# Patient Record
Sex: FEMALE | Race: WHITE | NOT HISPANIC OR LATINO | Employment: FULL TIME | URBAN - NONMETROPOLITAN AREA
[De-identification: names, ages, dates, MRNs, and addresses within clinical notes are randomized per-mention and may not be internally consistent; named-entity substitution may affect disease eponyms.]

---

## 2023-07-11 ENCOUNTER — TELEPHONE (OUTPATIENT)
Dept: PRIMARY CARE | Facility: CLINIC | Age: 48
End: 2023-07-11
Payer: COMMERCIAL

## 2023-07-11 NOTE — TELEPHONE ENCOUNTER
Patient found a tick attached (location on body)    They reported the following information to me:  When (did they find it on them)- 7/11/23  Location/ Travel (where in the country) did this happen? - [facility] / [facility]   Do they know how long it was on them? ( tick engorged/full of blood)- unsure/ tick was somewhat engorged  When Tick was removed was head attached?-yes   Do you have a rash? (explain what rash look like)- no, just one red dot at attachment site   Do you have flu like symptoms?- no     I have informed the patient that in General the CDC does not recommend taking antibiotics for a tick bite and that most patients do not need prophylactic antibiotics against Lyme disease.  I explained to them that there is a very low chance of danae Lyme disease if the tick was attached less than 36 hours.  Cleaning the area with soap  & water is all that is needed in most cases but I gathered this information to be reviewed by the provider to make sure they do not meet the criteria of needing an antibiotic.   I also advised the patient if interested the CDC website also has a lot of great information on tick bites.   Please review and call patient back to provider recommendations.  Thank you
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
## 2023-07-12 RX ORDER — DOXYCYCLINE 100 MG/1
200 CAPSULE ORAL DAILY
Qty: 2 CAPSULE | Refills: 0 | Status: SHIPPED | OUTPATIENT
Start: 2023-07-12 | End: 2023-07-13

## 2023-07-12 NOTE — TELEPHONE ENCOUNTER
Call and tell her to take 1 day of doxycycline to prevent any possibility of Lyme disease.  I will send to the pharmacy

## 2023-07-13 ENCOUNTER — TELEPHONE (OUTPATIENT)
Dept: PRIMARY CARE | Facility: CLINIC | Age: 48
End: 2023-07-13
Payer: COMMERCIAL

## 2023-07-13 NOTE — TELEPHONE ENCOUNTER
Pt took her dose of doxyclycline. She ended up vomiting it up 20 mins after taking it. She took it as directed. She wants to know if she should be prescribed something else or is it ok for her to go without? Advised pt that [facility] is out and will send to on call

## 2023-07-13 NOTE — TELEPHONE ENCOUNTER
Call back regarding doxycycline (prescribed dose to prevent lyme per Dr Staton)  Called today reporting vomited 20min after took dose    No recommendation to take another dose or different antibiotic    Monitor bite area for rash and notify if occurs   Initial

## 2023-07-17 ENCOUNTER — OFFICE VISIT (OUTPATIENT)
Dept: PRIMARY CARE | Facility: CLINIC | Age: 48
End: 2023-07-17
Payer: COMMERCIAL

## 2023-07-17 VITALS
OXYGEN SATURATION: 98 % | RESPIRATION RATE: 14 BRPM | WEIGHT: 204 LBS | SYSTOLIC BLOOD PRESSURE: 137 MMHG | DIASTOLIC BLOOD PRESSURE: 73 MMHG | TEMPERATURE: 98.8 F | HEART RATE: 78 BPM | BODY MASS INDEX: 35.02 KG/M2

## 2023-07-17 DIAGNOSIS — R70.0 ELEVATED SED RATE: ICD-10-CM

## 2023-07-17 DIAGNOSIS — R79.82 CRP ELEVATED: ICD-10-CM

## 2023-07-17 DIAGNOSIS — R79.82 ELEVATED C-REACTIVE PROTEIN (CRP): Primary | ICD-10-CM

## 2023-07-17 DIAGNOSIS — M16.0 BILATERAL HIP JOINT ARTHRITIS: ICD-10-CM

## 2023-07-17 DIAGNOSIS — M25.50 ARTHRALGIA OF MULTIPLE SITES: ICD-10-CM

## 2023-07-17 DIAGNOSIS — R07.9 CHEST PAIN, UNSPECIFIED TYPE: ICD-10-CM

## 2023-07-17 PROBLEM — D72.829 LEUKOCYTOSIS: Status: ACTIVE | Noted: 2023-07-17

## 2023-07-17 PROBLEM — R94.39 ABNORMAL CARDIOVASCULAR STRESS TEST: Status: ACTIVE | Noted: 2023-07-17

## 2023-07-17 PROBLEM — M54.50 LOW BACK PAIN: Status: ACTIVE | Noted: 2023-07-17

## 2023-07-17 PROBLEM — I10 BENIGN ESSENTIAL HYPERTENSION: Status: ACTIVE | Noted: 2023-07-17

## 2023-07-17 PROBLEM — E78.5 HYPERLIPIDEMIA: Status: ACTIVE | Noted: 2023-07-17

## 2023-07-17 PROBLEM — R01.1 HEART MURMUR: Status: ACTIVE | Noted: 2023-07-17

## 2023-07-17 PROBLEM — R73.09 ELEVATED HEMOGLOBIN A1C MEASUREMENT: Status: ACTIVE | Noted: 2023-07-17

## 2023-07-17 PROCEDURE — 3078F DIAST BP <80 MM HG: CPT | Performed by: FAMILY MEDICINE

## 2023-07-17 PROCEDURE — 1036F TOBACCO NON-USER: CPT | Performed by: FAMILY MEDICINE

## 2023-07-17 PROCEDURE — 99214 OFFICE O/P EST MOD 30 MIN: CPT | Performed by: FAMILY MEDICINE

## 2023-07-17 PROCEDURE — 3075F SYST BP GE 130 - 139MM HG: CPT | Performed by: FAMILY MEDICINE

## 2023-07-17 RX ORDER — DROSPIRENONE AND ETHINYL ESTRADIOL 0.02-3(28)
KIT ORAL
COMMUNITY
End: 2023-07-18 | Stop reason: WASHOUT

## 2023-07-17 RX ORDER — NORETHINDRONE 0.35 MG/1
1 TABLET ORAL DAILY
Qty: 28 TABLET | Refills: 3 | COMMUNITY
Start: 2023-07-12 | End: 2023-10-10

## 2023-07-17 RX ORDER — ATORVASTATIN CALCIUM 40 MG/1
40 TABLET, FILM COATED ORAL NIGHTLY
COMMUNITY
End: 2024-02-23

## 2023-07-17 RX ORDER — CELECOXIB 400 MG/1
CAPSULE ORAL
COMMUNITY
Start: 2023-04-27 | End: 2023-07-18 | Stop reason: WASHOUT

## 2023-07-17 RX ORDER — MULTIVIT WITH IRON,MINERALS
TABLET ORAL
COMMUNITY
End: 2024-02-05 | Stop reason: ALTCHOICE

## 2023-07-17 RX ORDER — TURMERIC 400 MG
CAPSULE ORAL
COMMUNITY

## 2023-07-17 RX ORDER — BUTYROSPERMUM PARKII(SHEA BUTTER), SIMMONDSIA CHINENSIS (JOJOBA) SEED OIL, ALOE BARBADENSIS LEAF EXTRACT .01; 1; 3.5 G/100G; G/100G; G/100G
250 LIQUID TOPICAL 2 TIMES DAILY
COMMUNITY
End: 2024-02-05 | Stop reason: ALTCHOICE

## 2023-07-17 RX ORDER — HYDROCHLOROTHIAZIDE 25 MG/1
25 TABLET ORAL DAILY
COMMUNITY
End: 2023-09-09

## 2023-07-17 ASSESSMENT — ENCOUNTER SYMPTOMS
EYES NEGATIVE: 1
ENDOCRINE NEGATIVE: 1
CONSTITUTIONAL NEGATIVE: 1
ARTHRALGIAS: 1
ALLERGIC/IMMUNOLOGIC NEGATIVE: 1
CARDIOVASCULAR NEGATIVE: 1
RESPIRATORY NEGATIVE: 1
PSYCHIATRIC NEGATIVE: 1
GASTROINTESTINAL NEGATIVE: 1
HEMATOLOGIC/LYMPHATIC NEGATIVE: 1
NEUROLOGICAL NEGATIVE: 1

## 2023-07-17 ASSESSMENT — PAIN SCALES - GENERAL: PAINLEVEL: 3

## 2023-07-17 NOTE — PATIENT INSTRUCTIONS
1.  Elevated C-reactive protein at her at elevated sed rate.    You are seen by one of the rheumatologist in March.  Unfortunately very little recommendation was given other than the fact that you have some mild arthritis in your hip joints.  In my opinion that is not enough to explain your significant elevated C-reactive protein and sed rate.  Clinically you continue to have muscle aches joint pains more than what I would consider to be normal.    You have knee pain today    You have significant crepitation with flexion and extension of your knee joints    I am recommending a chest x-ray to determine if you have signs or symptoms of sarcoidosis.  I will call you with those results if you have enlarged lymph nodes suggesting sarcoidosis we will most likely start you on prednisone and have you meet with a specialist.    If the chest x-ray is normal we should consider repeating blood work possibly doing some other blood tests possibly having you see a different rheumatologist    Please start on a new medication meloxicam which should help reduce arthritic pain it will not change the results of the chest x-ray and it will not reduce the C-reactive protein or the sed rate.    Please take the meloxicam with food if you have stomach upset on the meloxicam please call me    We will call you with results of the x-ray and then determine what to do next

## 2023-07-17 NOTE — PROGRESS NOTES
"Subjective   Patient ID: Cristela Borden is a 48 y.o. female who presents for Follow-up (Pt saw Rheumatology 03/14/2023) and Knee Pain (Intermittent x approx 2 months/No known injury/).    HPI     Patient is not currently having periods. She was told she had bilateral hip arthritis. Patient sleeps with her arms above her head, and woke up the other night because her elbow felt \".\" She has never tried Meloxicam and does not recall if she has tried Celebrex. Patient was not recommended much from a rheumatologist after finding an elevated C-reactive protein. She reports knee pain bilaterally.      Review of Systems   Constitutional: Negative.    HENT: Negative.     Eyes: Negative.    Respiratory: Negative.     Cardiovascular: Negative.    Gastrointestinal: Negative.    Endocrine: Negative.    Genitourinary: Negative.    Musculoskeletal:  Positive for arthralgias.   Skin: Negative.    Allergic/Immunologic: Negative.    Neurological: Negative.    Hematological: Negative.    Psychiatric/Behavioral: Negative.         Objective   /73 (BP Location: Right arm, Patient Position: Sitting, BP Cuff Size: Adult)   Pulse 78   Temp 37.1 °C (98.8 °F)   Resp 14   Wt 92.5 kg (204 lb)   SpO2 98%   BMI 35.02 kg/m²     Physical Exam  Constitutional:       Appearance: Normal appearance.   HENT:      Head: Normocephalic and atraumatic.      Right Ear: Tympanic membrane normal.      Left Ear: Tympanic membrane normal.      Nose: Nose normal.      Mouth/Throat:      Mouth: Mucous membranes are moist.      Pharynx: Oropharynx is clear.   Eyes:      Extraocular Movements: Extraocular movements intact.      Conjunctiva/sclera: Conjunctivae normal.      Pupils: Pupils are equal, round, and reactive to light.   Cardiovascular:      Rate and Rhythm: Normal rate and regular rhythm.      Pulses: Normal pulses.      Heart sounds: Normal heart sounds.   Pulmonary:      Effort: Pulmonary effort is normal.      Breath sounds: Normal " breath sounds.   Abdominal:      General: Bowel sounds are normal.      Palpations: Abdomen is soft.   Musculoskeletal:         General: Normal range of motion.      Cervical back: Normal range of motion and neck supple.      Comments: Tenderness with palpation over both knee joints.   Skin:     General: Skin is warm.   Neurological:      Mental Status: She is alert and oriented to person, place, and time.   Psychiatric:         Mood and Affect: Mood normal.         Behavior: Behavior normal.       Assessment/Plan   Diagnoses and all orders for this visit:  Elevated C-reactive protein (CRP)  -     XR chest 2 views; Future  Chest pain, unspecified type  Elevated sed rate  -     XR chest 2 views; Future  Arthralgia of multiple sites  CRP elevated  Bilateral hip joint arthritis      1.  Elevated C-reactive protein at her at elevated sed rate.    You are seen by one of the rheumatologist in March.  Unfortunately very little recommendation was given other than the fact that you have some mild arthritis in your hip joints.  In my opinion that is not enough to explain your significant elevated C-reactive protein and sed rate.  Clinically you continue to have muscle aches joint pains more than what I would consider to be normal.    You have knee pain today    You have significant crepitation with flexion and extension of your knee joints    I am recommending a chest x-ray to determine if you have signs or symptoms of sarcoidosis.  I will call you with those results if you have enlarged lymph nodes suggesting sarcoidosis we will most likely start you on prednisone and have you meet with a specialist.    If the chest x-ray is normal we should consider repeating blood work possibly doing some other blood tests possibly having you see a different rheumatologist    Please start on a new medication meloxicam which should help reduce arthritic pain it will not change the results of the chest x-ray and it will not reduce the  C-reactive protein or the sed rate.    Please take the meloxicam with food if you have stomach upset on the meloxicam please call me    We will call you with results of the x-ray and then determine what to do next      Scribe Attestation  By signing my name below, IMaría , Suzanne   attest that this documentation has been prepared under the direction and in the presence of Kevyn Staton MD.

## 2023-07-18 ENCOUNTER — TELEPHONE (OUTPATIENT)
Dept: PRIMARY CARE | Facility: CLINIC | Age: 48
End: 2023-07-18
Payer: COMMERCIAL

## 2023-07-18 PROBLEM — M16.0 BILATERAL HIP JOINT ARTHRITIS: Status: ACTIVE | Noted: 2023-07-18

## 2023-07-18 PROBLEM — R70.0 ELEVATED SED RATE: Status: ACTIVE | Noted: 2023-07-18

## 2023-07-18 NOTE — TELEPHONE ENCOUNTER
Call the patient, the chest xray was normal,  I want her to get some more labs and an Urine test.  I placed the orders, depending on these results we will have her see additional specialist

## 2023-09-09 DIAGNOSIS — I10 ESSENTIAL (PRIMARY) HYPERTENSION: ICD-10-CM

## 2023-09-09 RX ORDER — HYDROCHLOROTHIAZIDE 25 MG/1
25 TABLET ORAL DAILY
Qty: 90 TABLET | Refills: 3 | Status: SHIPPED | OUTPATIENT
Start: 2023-09-09

## 2023-10-12 ENCOUNTER — TELEPHONE (OUTPATIENT)
Dept: PRIMARY CARE | Facility: CLINIC | Age: 48
End: 2023-10-12
Payer: COMMERCIAL

## 2023-10-12 DIAGNOSIS — R79.82 ELEVATED C-REACTIVE PROTEIN (CRP): Primary | ICD-10-CM

## 2023-10-12 DIAGNOSIS — M25.50 ARTHRALGIA OF MULTIPLE SITES: ICD-10-CM

## 2023-10-12 NOTE — TELEPHONE ENCOUNTER
Call and tell her the labs she had done at the outside lab continue to show an elevated sed rate and c reactive protein,  I am concerned she has some inflammation in her body that is causing these abnormal labs, I am recommending a repeat evaluation with a different rheumatologist.  I will place a referral

## 2024-01-26 ENCOUNTER — APPOINTMENT (OUTPATIENT)
Dept: RHEUMATOLOGY | Facility: CLINIC | Age: 49
End: 2024-01-26
Payer: COMMERCIAL

## 2024-01-29 PROBLEM — Z30.9 CONTRACEPTION MANAGEMENT: Status: ACTIVE | Noted: 2024-01-29

## 2024-01-29 PROBLEM — Z87.891 FORMER SMOKER: Status: ACTIVE | Noted: 2024-01-29

## 2024-01-29 PROBLEM — Z80.0 FAMILY HISTORY OF PANCREATIC CANCER: Status: ACTIVE | Noted: 2024-01-29

## 2024-01-29 PROBLEM — Z80.3 FAMILY HISTORY OF BREAST CANCER: Status: ACTIVE | Noted: 2024-01-29

## 2024-01-29 RX ORDER — DROSPIRENONE AND ETHINYL ESTRADIOL 0.02-3(28)
1 KIT ORAL DAILY
COMMUNITY
End: 2024-02-05 | Stop reason: ALTCHOICE

## 2024-01-30 PROBLEM — R94.39 ABNORMAL CARDIOVASCULAR STRESS TEST: Status: RESOLVED | Noted: 2023-07-17 | Resolved: 2024-01-30

## 2024-01-30 PROBLEM — Z87.891 FORMER SMOKER: Status: RESOLVED | Noted: 2024-01-29 | Resolved: 2024-01-30

## 2024-02-05 ENCOUNTER — OFFICE VISIT (OUTPATIENT)
Dept: RHEUMATOLOGY | Facility: CLINIC | Age: 49
End: 2024-02-05
Payer: COMMERCIAL

## 2024-02-05 VITALS
SYSTOLIC BLOOD PRESSURE: 139 MMHG | BODY MASS INDEX: 36.11 KG/M2 | TEMPERATURE: 97.2 F | HEART RATE: 85 BPM | OXYGEN SATURATION: 98 % | WEIGHT: 211.5 LBS | DIASTOLIC BLOOD PRESSURE: 81 MMHG | HEIGHT: 64 IN

## 2024-02-05 DIAGNOSIS — M77.9 ENTHESITIS: Primary | ICD-10-CM

## 2024-02-05 PROCEDURE — 3075F SYST BP GE 130 - 139MM HG: CPT | Performed by: INTERNAL MEDICINE

## 2024-02-05 PROCEDURE — 99204 OFFICE O/P NEW MOD 45 MIN: CPT | Performed by: INTERNAL MEDICINE

## 2024-02-05 PROCEDURE — 3079F DIAST BP 80-89 MM HG: CPT | Performed by: INTERNAL MEDICINE

## 2024-02-05 PROCEDURE — 1036F TOBACCO NON-USER: CPT | Performed by: INTERNAL MEDICINE

## 2024-02-05 ASSESSMENT — ENCOUNTER SYMPTOMS
LIGHT-HEADEDNESS: 0
EYE ITCHING: 0
NUMBNESS: 0
CHILLS: 0
ROS SKIN COMMENTS: HAIR LOSS
WEAKNESS: 0
MYALGIAS: 1
DIZZINESS: 0
VOICE CHANGE: 0
FLANK PAIN: 0
HEADACHES: 0
PALPITATIONS: 0
NAUSEA: 0
CONFUSION: 0
EYE PAIN: 0
NERVOUS/ANXIOUS: 0
EYE DISCHARGE: 0
PHOTOPHOBIA: 0
SHORTNESS OF BREATH: 1
DIARRHEA: 0
DYSURIA: 0
ABDOMINAL PAIN: 0
DIFFICULTY URINATING: 0
HEMATURIA: 0
TROUBLE SWALLOWING: 0
FATIGUE: 0
COLOR CHANGE: 0
JOINT SWELLING: 0
BRUISES/BLEEDS EASILY: 0
UNEXPECTED WEIGHT CHANGE: 0
BACK PAIN: 0
CONSTIPATION: 0
CHEST TIGHTNESS: 0
ARTHRALGIAS: 1
AGITATION: 0
RHINORRHEA: 0
BLOOD IN STOOL: 0
COUGH: 0
POLYDIPSIA: 0
VOMITING: 0
FEVER: 0
DYSPHORIC MOOD: 0
EYE REDNESS: 0
SLEEP DISTURBANCE: 0
SORE THROAT: 0

## 2024-02-05 ASSESSMENT — PATIENT HEALTH QUESTIONNAIRE - PHQ9
2. FEELING DOWN, DEPRESSED OR HOPELESS: NOT AT ALL
1. LITTLE INTEREST OR PLEASURE IN DOING THINGS: NOT AT ALL
SUM OF ALL RESPONSES TO PHQ9 QUESTIONS 1 AND 2: 0

## 2024-02-05 NOTE — ASSESSMENT & PLAN NOTE
Pt presents with predominantly an enthesitis of large and medium sized joints but some small joint involvement is also noted.  She has FH of spondylitis and I am concerned that she may have a seronegative spondyloarthropathy--psoriatic arthritis (no rash), reactive arthritis (no h/o infection), ankylosing spondylitis (women tend to have more peripheral disease), inflammatory bowel disease related spondylitis (no GI symptoms).  Labs ordered to further evaluate.

## 2024-02-05 NOTE — LETTER
February 5, 2024     Kevyn Staton MD  [address]  [facility] [address]  [facility] [address]    Patient: Cristela Borden   YOB: 1975   Date of Visit: 2/5/2024       Dear Dr. Kevyn Staton MD:    Thank you for referring Cristela Borden to me for evaluation. Below are my notes for this consultation.  If you have questions, please do not hesitate to call me. I look forward to following your patient along with you.       Sincerely,     Tamara Vizcarra MD      CC: No Recipients  ______________________________________________________________________________________    Rheumatology Consultation Note  PCP:  Brionna    Chief Complaint   Patient presents with   • Establish Care   • Arthritis       SUBJECTIVE  Pt presents with persistently elevated inflammatory markers.   Pt reports she was doing well until several years ago when she was started on BP and cholesterol lowering medication.   About 6 months later, she developed chest pain--failed stress test but cath was ok.  Ever since she has been achy--especially in elbows, shoulders and knees.   She has had an inflamed L achilles tendon for a year--using topical agent and not helping.   Pt saw rheum --no dx given.  Pt's mother has RA and a cousin has ankylosing spondylitis  Pt was told she had an inflamed uterus by her GYN but was told she was ok.  (Was not told she had an infection)    Has AM stiffness.  Has trouble the first time she does stairs but otherwise, ok.   Has trouble holding papers in her hand      Patient Active Problem List    Diagnosis Date Noted   • Enthesitis 02/05/2024   • Contraception management 01/29/2024   • Family history of breast cancer 01/29/2024   • Family history of pancreatic cancer 01/29/2024   • Elevated sed rate 07/18/2023   • Bilateral hip joint arthritis 07/18/2023   • Arthralgia of multiple sites 07/17/2023   • Benign essential hypertension 07/17/2023   • Elevated hemoglobin A1c measurement 07/17/2023   • Heart  murmur 07/17/2023   • Hyperlipidemia 07/17/2023   • Leukocytosis 07/17/2023   • Elevated C-reactive protein (CRP) 07/17/2023   • Low back pain 07/17/2023   • BMI 35.0-35.9,adult 07/17/2023   • Varicose veins of bilateral lower extremities with other complications 08/15/2011     Past Medical History:   Diagnosis Date   • Atypical chest pain 03/05/2019   • Does not use caffeine      Current Outpatient Medications   Medication Instructions   • atorvastatin (LIPITOR) 40 mg, oral, Nightly   • hydroCHLOROthiazide (HYDRODIURIL) 25 mg, oral, Daily, as directed   • Incassia 0.35 mg tablet 1 tablet, oral, Daily   • turmeric 400 mg capsule oral     No Known Allergies  Review of Systems   Constitutional:  Negative for chills, fatigue, fever and unexpected weight change.   HENT:  Positive for hearing loss and tinnitus. Negative for congestion, mouth sores, nosebleeds, rhinorrhea, sneezing, sore throat, trouble swallowing and voice change.         No dry eye and dry mouth   Eyes:  Negative for photophobia, pain, discharge, redness, itching and visual disturbance.   Respiratory:  Positive for shortness of breath. Negative for cough and chest tightness.    Cardiovascular:  Negative for chest pain, palpitations and leg swelling.   Gastrointestinal:  Negative for abdominal pain, blood in stool, constipation, diarrhea, nausea and vomiting.   Endocrine: Negative for polydipsia and polyuria.   Genitourinary:  Positive for pelvic pain. Negative for difficulty urinating, dysuria, flank pain and hematuria.   Musculoskeletal:  Positive for arthralgias and myalgias. Negative for back pain and joint swelling.   Skin:  Negative for color change and rash.        Hair loss   Neurological:  Negative for dizziness, syncope, weakness, light-headedness, numbness and headaches.   Hematological:  Does not bruise/bleed easily.   Psychiatric/Behavioral:  Negative for agitation, confusion, dysphoric mood and sleep disturbance. The patient is not  "nervous/anxious.        PHYSICAL EXAM  /81   Pulse 85   Temp 36.2 °C (97.2 °F)   Ht 1.626 m (5' 4\")   Wt 95.9 kg (211 lb 8 oz)   SpO2 98%   BMI 36.30 kg/m²   Physical Exam  Vitals reviewed.   Constitutional:       General: She is not in acute distress.     Appearance: Normal appearance.   HENT:      Head: Normocephalic and atraumatic.   Eyes:      Extraocular Movements: Extraocular movements intact.      Conjunctiva/sclera: Conjunctivae normal.      Pupils: Pupils are equal, round, and reactive to light.   Pulmonary:      Effort: Pulmonary effort is normal. No respiratory distress.   Musculoskeletal:         General: Swelling and tenderness present. No deformity.      Cervical back: Normal range of motion. Rigidity present. No tenderness.      Right lower leg: No edema.      Left lower leg: No edema.      Comments: Mild swelling dorsum of hand R (left normal)  Elbows without synovitis and mild tenderness to palpation over lateral epicondyle.  Shoulders with mild swelling at anterior capsule and tenderness to palpation in that area  No SI joint tenderness  Knees with lax patellae but no synovitis.  L Achilles with protuberance and thickening.  Toes with fusiform swelling   Skin:     Coloration: Skin is not pale.      Findings: No erythema or rash.   Neurological:      General: No focal deficit present.      Mental Status: She is alert and oriented to person, place, and time. Mental status is at baseline.      Gait: Gait normal.   Psychiatric:         Mood and Affect: Mood normal.       CRP and ESR elevated (reviewed in chart--see scanned documents)  Assessment/plan  Problem List Items Addressed This Visit       Enthesitis - Primary    Current Assessment & Plan     Pt presents with predominantly an enthesitis of large and medium sized joints but some small joint involvement is also noted.  She has FH of spondylitis and I am concerned that she may have a seronegative spondyloarthropathy--psoriatic arthritis " (no rash), reactive arthritis (no h/o infection), ankylosing spondylitis (women tend to have more peripheral disease), inflammatory bowel disease related spondylitis (no GI symptoms).  Labs ordered to further evaluate.           Relevant Orders    LILA with Reflex to MARGARITA    CBC and Auto Differential    Citrulline Antibody, IgG    Comprehensive Metabolic Panel    C-Reactive Protein    HLAB27 Typing    Rheumatoid Factor    Sedimentation Rate   The patient's labs, radiology images and reports and other tests done prior to appointment were obtained, reviewed and summarized as applicable from the physician portal, EHR symptoms and/or outside sources.  Pertinent positive and negative findings were considered in medical decision making.  Old records were reviewed and further were requested from previous physicians  All questions were answered and patient was counseled regarding diagnosis, prognosis, risks and benefits of various treatment options and importance of compliance with therapy    Follow up: based on results

## 2024-02-05 NOTE — PROGRESS NOTES
Rheumatology Consultation Note  PCP:  Brionna    Chief Complaint   Patient presents with    Establish Care    Arthritis       SUBJECTIVE  Pt presents with persistently elevated inflammatory markers.   Pt reports she was doing well until several years ago when she was started on BP and cholesterol lowering medication.   About 6 months later, she developed chest pain--failed stress test but cath was ok.  Ever since she has been achy--especially in elbows, shoulders and knees.   She has had an inflamed L achilles tendon for a year--using topical agent and not helping.   Pt saw rheum --no dx given.  Pt's mother has RA and a cousin has ankylosing spondylitis  Pt was told she had an inflamed uterus by her GYN but was told she was ok.  (Was not told she had an infection)    Has AM stiffness.  Has trouble the first time she does stairs but otherwise, ok.   Has trouble holding papers in her hand      Patient Active Problem List    Diagnosis Date Noted    Enthesitis 02/05/2024    Contraception management 01/29/2024    Family history of breast cancer 01/29/2024    Family history of pancreatic cancer 01/29/2024    Elevated sed rate 07/18/2023    Bilateral hip joint arthritis 07/18/2023    Arthralgia of multiple sites 07/17/2023    Benign essential hypertension 07/17/2023    Elevated hemoglobin A1c measurement 07/17/2023    Heart murmur 07/17/2023    Hyperlipidemia 07/17/2023    Leukocytosis 07/17/2023    Elevated C-reactive protein (CRP) 07/17/2023    Low back pain 07/17/2023    BMI 35.0-35.9,adult 07/17/2023    Varicose veins of bilateral lower extremities with other complications 08/15/2011     Past Medical History:   Diagnosis Date    Atypical chest pain 03/05/2019    Does not use caffeine      Current Outpatient Medications   Medication Instructions    atorvastatin (LIPITOR) 40 mg, oral, Nightly    hydroCHLOROthiazide (HYDRODIURIL) 25 mg, oral, Daily, as directed    Incassia 0.35 mg tablet 1 tablet, oral, Daily    turmeric  "400 mg capsule oral     No Known Allergies  Review of Systems   Constitutional:  Negative for chills, fatigue, fever and unexpected weight change.   HENT:  Positive for hearing loss and tinnitus. Negative for congestion, mouth sores, nosebleeds, rhinorrhea, sneezing, sore throat, trouble swallowing and voice change.         No dry eye and dry mouth   Eyes:  Negative for photophobia, pain, discharge, redness, itching and visual disturbance.   Respiratory:  Positive for shortness of breath. Negative for cough and chest tightness.    Cardiovascular:  Negative for chest pain, palpitations and leg swelling.   Gastrointestinal:  Negative for abdominal pain, blood in stool, constipation, diarrhea, nausea and vomiting.   Endocrine: Negative for polydipsia and polyuria.   Genitourinary:  Positive for pelvic pain. Negative for difficulty urinating, dysuria, flank pain and hematuria.   Musculoskeletal:  Positive for arthralgias and myalgias. Negative for back pain and joint swelling.   Skin:  Negative for color change and rash.        Hair loss   Neurological:  Negative for dizziness, syncope, weakness, light-headedness, numbness and headaches.   Hematological:  Does not bruise/bleed easily.   Psychiatric/Behavioral:  Negative for agitation, confusion, dysphoric mood and sleep disturbance. The patient is not nervous/anxious.        PHYSICAL EXAM  /81   Pulse 85   Temp 36.2 °C (97.2 °F)   Ht 1.626 m (5' 4\")   Wt 95.9 kg (211 lb 8 oz)   SpO2 98%   BMI 36.30 kg/m²   Physical Exam  Vitals reviewed.   Constitutional:       General: She is not in acute distress.     Appearance: Normal appearance.   HENT:      Head: Normocephalic and atraumatic.   Eyes:      Extraocular Movements: Extraocular movements intact.      Conjunctiva/sclera: Conjunctivae normal.      Pupils: Pupils are equal, round, and reactive to light.   Pulmonary:      Effort: Pulmonary effort is normal. No respiratory distress.   Musculoskeletal:         " General: Swelling and tenderness present. No deformity.      Cervical back: Normal range of motion. Rigidity present. No tenderness.      Right lower leg: No edema.      Left lower leg: No edema.      Comments: Mild swelling dorsum of hand R (left normal)  Elbows without synovitis and mild tenderness to palpation over lateral epicondyle.  Shoulders with mild swelling at anterior capsule and tenderness to palpation in that area  No SI joint tenderness  Knees with lax patellae but no synovitis.  L Achilles with protuberance and thickening.  Toes with fusiform swelling   Skin:     Coloration: Skin is not pale.      Findings: No erythema or rash.   Neurological:      General: No focal deficit present.      Mental Status: She is alert and oriented to person, place, and time. Mental status is at baseline.      Gait: Gait normal.   Psychiatric:         Mood and Affect: Mood normal.       CRP and ESR elevated (reviewed in chart--see scanned documents)  Assessment/plan  Problem List Items Addressed This Visit       Enthesitis - Primary    Current Assessment & Plan     Pt presents with predominantly an enthesitis of large and medium sized joints but some small joint involvement is also noted.  She has FH of spondylitis and I am concerned that she may have a seronegative spondyloarthropathy--psoriatic arthritis (no rash), reactive arthritis (no h/o infection), ankylosing spondylitis (women tend to have more peripheral disease), inflammatory bowel disease related spondylitis (no GI symptoms).  Labs ordered to further evaluate.           Relevant Orders    LILA with Reflex to MARGARITA    CBC and Auto Differential    Citrulline Antibody, IgG    Comprehensive Metabolic Panel    C-Reactive Protein    HLAB27 Typing    Rheumatoid Factor    Sedimentation Rate   The patient's labs, radiology images and reports and other tests done prior to appointment were obtained, reviewed and summarized as applicable from the physician portal, EHR  symptoms and/or outside sources.  Pertinent positive and negative findings were considered in medical decision making.  Old records were reviewed and further were requested from previous physicians  All questions were answered and patient was counseled regarding diagnosis, prognosis, risks and benefits of various treatment options and importance of compliance with therapy    Follow up: based on results

## 2024-02-09 DIAGNOSIS — M47.819 SPONDYLOARTHRITIS: Primary | ICD-10-CM

## 2024-02-16 ENCOUNTER — OFFICE VISIT (OUTPATIENT)
Dept: PRIMARY CARE | Facility: CLINIC | Age: 49
End: 2024-02-16
Payer: COMMERCIAL

## 2024-02-16 VITALS
HEIGHT: 64 IN | BODY MASS INDEX: 35.27 KG/M2 | OXYGEN SATURATION: 100 % | HEART RATE: 84 BPM | WEIGHT: 206.6 LBS | SYSTOLIC BLOOD PRESSURE: 136 MMHG | TEMPERATURE: 98 F | DIASTOLIC BLOOD PRESSURE: 74 MMHG

## 2024-02-16 DIAGNOSIS — E78.2 MIXED HYPERLIPIDEMIA: ICD-10-CM

## 2024-02-16 DIAGNOSIS — Z12.31 BREAST CANCER SCREENING BY MAMMOGRAM: ICD-10-CM

## 2024-02-16 DIAGNOSIS — Z00.00 PHYSICAL EXAM, ANNUAL: Primary | ICD-10-CM

## 2024-02-16 DIAGNOSIS — E78.5 HYPERLIPIDEMIA, UNSPECIFIED HYPERLIPIDEMIA TYPE: ICD-10-CM

## 2024-02-16 DIAGNOSIS — R79.82 ELEVATED C-REACTIVE PROTEIN (CRP): ICD-10-CM

## 2024-02-16 DIAGNOSIS — Z12.11 ENCOUNTER FOR SCREENING FOR MALIGNANT NEOPLASM OF COLON: ICD-10-CM

## 2024-02-16 LAB — SEDIMENTATION RATE, ERYTHROCYTE EXTERNAL: 28 MM/H

## 2024-02-16 PROCEDURE — 3078F DIAST BP <80 MM HG: CPT | Performed by: FAMILY MEDICINE

## 2024-02-16 PROCEDURE — 3075F SYST BP GE 130 - 139MM HG: CPT | Performed by: FAMILY MEDICINE

## 2024-02-16 PROCEDURE — 99396 PREV VISIT EST AGE 40-64: CPT | Performed by: FAMILY MEDICINE

## 2024-02-16 PROCEDURE — 1036F TOBACCO NON-USER: CPT | Performed by: FAMILY MEDICINE

## 2024-02-16 RX ORDER — FERROUS SULFATE, DRIED 160(50) MG
1 TABLET, EXTENDED RELEASE ORAL DAILY
COMMUNITY

## 2024-02-16 RX ORDER — CELECOXIB 200 MG/1
200 CAPSULE ORAL DAILY
Qty: 90 CAPSULE | Refills: 3 | Status: SHIPPED | OUTPATIENT
Start: 2024-02-16 | End: 2025-02-15

## 2024-02-16 RX ORDER — VITAMIN E MIXED 400 UNIT
CAPSULE ORAL DAILY
COMMUNITY

## 2024-02-16 ASSESSMENT — ENCOUNTER SYMPTOMS
CONSTITUTIONAL NEGATIVE: 1
PSYCHIATRIC NEGATIVE: 1
NEUROLOGICAL NEGATIVE: 1
CARDIOVASCULAR NEGATIVE: 1
GASTROINTESTINAL NEGATIVE: 1
HEMATOLOGIC/LYMPHATIC NEGATIVE: 1
ALLERGIC/IMMUNOLOGIC NEGATIVE: 1
RESPIRATORY NEGATIVE: 1
ENDOCRINE NEGATIVE: 1
MUSCULOSKELETAL NEGATIVE: 1
EYES NEGATIVE: 1

## 2024-02-16 NOTE — PROGRESS NOTES
"Subjective   Patient ID: Cristela Borden is a 48 y.o. female who presents for Annual Exam (CPE).    HPI     The patient is wondering why her C-Reactive protein elevated and is concerned.     The patient is taking birth control pills. She gets pelvic pain and had an ultrasound done which should an ovarian cyst.     The patient mentions persistent sinus drainage.    The patient denies any changes in vision, hearing or dental.     The patient maintains they do not have any chest pain, chest tightness or shortness of breath.    They do not experience nausea, emesis, changes in bowel movements or dyspepsia.    The patient's colonoscopy is not up to date.    The patient's mammogram is not up to date.    The patient's vaccinations are up to date.      Review of Systems   Constitutional: Negative.    HENT: Negative.     Eyes: Negative.    Respiratory: Negative.     Cardiovascular: Negative.    Gastrointestinal: Negative.    Endocrine: Negative.    Genitourinary: Negative.    Musculoskeletal: Negative.    Skin: Negative.    Allergic/Immunologic: Negative.    Neurological: Negative.    Hematological: Negative.    Psychiatric/Behavioral: Negative.         Objective   /74 (BP Location: Right arm, Patient Position: Sitting, BP Cuff Size: Large adult)   Pulse 84   Temp 36.7 °C (98 °F) (Temporal)   Ht 1.632 m (5' 4.25\")   Wt 93.7 kg (206 lb 9.6 oz)   SpO2 100%   BMI 35.19 kg/m²     Physical Exam  Constitutional:       Appearance: Normal appearance.   HENT:      Head: Normocephalic and atraumatic.      Nose: Nose normal.   Eyes:      Extraocular Movements: Extraocular movements intact.      Conjunctiva/sclera: Conjunctivae normal.      Pupils: Pupils are equal, round, and reactive to light.   Cardiovascular:      Rate and Rhythm: Normal rate and regular rhythm.      Pulses: Normal pulses.      Heart sounds: Normal heart sounds.   Pulmonary:      Effort: Pulmonary effort is normal.      Breath sounds: Normal breath " sounds.   Abdominal:      General: Bowel sounds are normal.      Palpations: Abdomen is soft.   Genitourinary:     General: Normal vulva.      Rectum: Normal.   Musculoskeletal:         General: Normal range of motion.      Cervical back: Normal range of motion and neck supple.   Skin:     General: Skin is warm.   Neurological:      Mental Status: She is alert and oriented to person, place, and time.   Psychiatric:         Mood and Affect: Mood normal.         Behavior: Behavior normal.         Thought Content: Thought content normal.         Judgment: Judgment normal.         Assessment/Plan   Problem List Items Addressed This Visit             ICD-10-CM    Hyperlipidemia E78.5    Relevant Orders    Lipid Panel    Follow Up In [facility] - PCP - Established    Elevated C-reactive protein (CRP) R79.82    Breast cancer screening by mammogram - Primary Z12.31    Relevant Orders    BI mammo bilateral screening tomosynthesis     Other Visit Diagnoses         Codes    Encounter for screening for malignant neoplasm of colon     Z12.11    Relevant Orders    Colonoscopy Screening; Average Risk Patient               1. Physical exam, annual  Hemoglobin A1C    Tsh With Reflex To Free T4 If Abnormal    T3, free    T4, free      2. Hyperlipidemia, unspecified hyperlipidemia type  Follow Up In [facility] - PCP - Established      3. Encounter for screening for malignant neoplasm of colon  Colonoscopy Screening; Average Risk Patient      4. Breast cancer screening by mammogram  BI mammo bilateral screening tomosynthesis      5. Mixed hyperlipidemia  Lipid Panel      6. Elevated C-reactive protein (CRP)          1.  Well visit    Today in the office he had your annual wellness exam    Please have a mammogram for breast cancer screening    You are up-to-date with your tetanus shot    Please have fasting labs at your convenience we will check cholesterol and thyroid.  All other labs have recently been tested.   We will also check a hemoglobin A1c screening for diabetes.    Keep eating a heart healthy diet a good goal 5-7 servings of fresh fruit and vegetable every day along with lean protein avoid simple sugars avoid fast foods    Keep walking keep being active 30 minutes 5 days a week is ideal certainly would call if any chest pains with activity    Continue on atorvastatin 40 mg a day to help lower cholesterol to prevent heart attack and stroke    Continue on hydrochlorothiazide 25 mg a day to help lower blood pressure which also reduces risk for heart attack and stroke    Keep seeing your gynecologist on annual basis    Please follow-up with the rheumatologist in regards to your elevated C-reactive protein and your inflammation.  Due to the elevated C-reactive protein and inflammation I am recommending a colonoscopy we can place that referral    If you otherwise stay healthy I would like to see you back in 6 months to check on blood pressure and cholesterol I am happy to see you sooner if needed    Follow-up in 6 months or sooner if there are any concerns.    Scribe Attestation  By signing my name below, IBethany, Scribe   attest that this documentation has been prepared under the direction and in the presence of Kevyn Staton MD.    This note has been transcribed using a medical scribe and there is a possibility of unintentional typing misprints.

## 2024-02-16 NOTE — PATIENT INSTRUCTIONS
1.  Well visit    Today in the office he had your annual wellness exam    Please have a mammogram for breast cancer screening    You are up-to-date with your tetanus shot    Please have fasting labs at your convenience we will check cholesterol and thyroid.  All other labs have recently been tested.  We will also check a hemoglobin A1c screening for diabetes.    Keep eating a heart healthy diet a good goal 5-7 servings of fresh fruit and vegetable every day along with lean protein avoid simple sugars avoid fast foods    Keep walking keep being active 30 minutes 5 days a week is ideal certainly would call if any chest pains with activity    Continue on atorvastatin 40 mg a day to help lower cholesterol to prevent heart attack and stroke    Continue on hydrochlorothiazide 25 mg a day to help lower blood pressure which also reduces risk for heart attack and stroke    Keep seeing your gynecologist on annual basis    Please follow-up with the rheumatologist in regards to your elevated C-reactive protein and your inflammation.  Due to the elevated C-reactive protein and inflammation I am recommending a colonoscopy we can place that referral    If you otherwise stay healthy I would like to see you back in 6 months to check on blood pressure and cholesterol I am happy to see you sooner if needed

## 2024-02-23 DIAGNOSIS — E78.5 HYPERLIPIDEMIA, UNSPECIFIED: ICD-10-CM

## 2024-02-23 RX ORDER — ATORVASTATIN CALCIUM 40 MG/1
40 TABLET, FILM COATED ORAL NIGHTLY
Qty: 90 TABLET | Refills: 3 | Status: SHIPPED | OUTPATIENT
Start: 2024-02-23

## 2024-05-31 ENCOUNTER — OFFICE VISIT (OUTPATIENT)
Dept: CARDIOLOGY | Facility: CLINIC | Age: 49
End: 2024-05-31
Payer: COMMERCIAL

## 2024-05-31 VITALS
WEIGHT: 207.9 LBS | BODY MASS INDEX: 35.49 KG/M2 | HEART RATE: 84 BPM | SYSTOLIC BLOOD PRESSURE: 132 MMHG | HEIGHT: 64 IN | DIASTOLIC BLOOD PRESSURE: 74 MMHG

## 2024-05-31 DIAGNOSIS — R01.1 HEART MURMUR: ICD-10-CM

## 2024-05-31 DIAGNOSIS — E78.2 MIXED HYPERLIPIDEMIA: ICD-10-CM

## 2024-05-31 DIAGNOSIS — I10 BENIGN ESSENTIAL HYPERTENSION: ICD-10-CM

## 2024-05-31 PROCEDURE — 99213 OFFICE O/P EST LOW 20 MIN: CPT | Performed by: INTERNAL MEDICINE

## 2024-05-31 PROCEDURE — 1036F TOBACCO NON-USER: CPT | Performed by: INTERNAL MEDICINE

## 2024-05-31 PROCEDURE — 3008F BODY MASS INDEX DOCD: CPT | Performed by: INTERNAL MEDICINE

## 2024-05-31 PROCEDURE — 3078F DIAST BP <80 MM HG: CPT | Performed by: INTERNAL MEDICINE

## 2024-05-31 PROCEDURE — 3075F SYST BP GE 130 - 139MM HG: CPT | Performed by: INTERNAL MEDICINE

## 2024-05-31 ASSESSMENT — ENCOUNTER SYMPTOMS
NEUROLOGICAL NEGATIVE: 1
CONSTITUTIONAL NEGATIVE: 1
CARDIOVASCULAR NEGATIVE: 1
RESPIRATORY NEGATIVE: 1

## 2024-05-31 NOTE — PROGRESS NOTES
CARDIOLOGY OFFICE VISIT      CHIEF COMPLAINT      HISTORY OF PRESENT ILLNESS  The patient states she has been doing well.  She denies chest discomfort.  She denies dyspnea on exertion.  She denies palpitations, presyncope, and syncope.  She denies any problem with her medication.  She states that when she gets her blood pressure checked it is under control.  She has lab work done every 6 months by her family physician and she states the lab work is good.      Impression:  Hypertension  Hyperlipidemia  Overweight  Former smoker     Please excuse any errors in grammar or translation related to this dictation. Voice recognition software was utilized to prepare this document.        Past Medical History  Past Medical History:   Diagnosis Date    Atypical chest pain 03/05/2019    Does not use caffeine        Social History  Social History     Tobacco Use    Smoking status: Former     Current packs/day: 0.50     Average packs/day: 0.5 packs/day for 3.0 years (1.5 ttl pk-yrs)     Types: Cigarettes     Passive exposure: Past    Smokeless tobacco: Never   Substance Use Topics    Alcohol use: Yes     Alcohol/week: 6.0 standard drinks of alcohol     Types: 6 Standard drinks or equivalent per week     Comment: social    Drug use: Never       Family History     Family History   Problem Relation Name Age of Onset    Other (cerebrovascular accident CVA) Mother      Other (malignant neoplasm of breast) Mother      Skin cancer Mother      Rheum arthritis Mother      Other (no pertinent family history) Father      Other (transposition of nerve in arm) Son      Pancreatic cancer Maternal Grandmother      Ankylosing spondylitis Cousin      Diabetes Other multiple     Hypertension Other multiple         Allergies:  No Known Allergies     Outpatient Medications:  Current Outpatient Medications   Medication Instructions    atorvastatin (LIPITOR) 40 mg, oral, Nightly    B complex-vitamin C-folic acid (Nephro-[facility] Rx) 1- mg-mg-mcg  tablet 1 tablet, oral, Daily with breakfast    calcium carbonate-vitamin D3 500 mg-5 mcg (200 unit) tablet 1 tablet, oral, Daily    celecoxib (CELEBREX) 200 mg, oral, Daily    hydroCHLOROthiazide (HYDRODIURIL) 25 mg, oral, Daily, as directed    Incassia 0.35 mg tablet 1 tablet, oral, Daily    turmeric 400 mg capsule oral    vitamin E 450 mg (1000 unit) capsule oral, Daily          REVIEW OF SYSTEMS  Review of Systems   Constitutional: Negative.   Cardiovascular: Negative.    Respiratory: Negative.     Neurological: Negative.    All other systems reviewed and are negative.        VITALS  Vitals:    05/31/24 1027   BP: 132/74   Pulse: 84       PHYSICAL EXAM  Constitutional:       Appearance: Healthy appearance. Not in distress.   Eyes:      Conjunctiva/sclera: Conjunctivae normal.      Pupils: Pupils are equal, round, and reactive to light.   Neck:      Vascular: No JVR. JVD normal.   Pulmonary:      Effort: Pulmonary effort is normal.      Breath sounds: Normal breath sounds. No wheezing. No rhonchi. No rales.   Chest:      Chest wall: Not tender to palpatation.   Cardiovascular:      PMI at left midclavicular line. Normal rate. Regular rhythm. Normal S1. Normal S2.       Murmurs: There is no murmur.      No gallop.  No click. No rub.   Pulses:     Intact distal pulses.   Edema:     Peripheral edema absent.   Abdominal:      Tenderness: There is no abdominal tenderness.   Musculoskeletal: Normal range of motion.         General: No tenderness.      Cervical back: Normal range of motion. Skin:     General: Skin is warm and dry.   Neurological:      General: No focal deficit present.      Mental Status: Alert and oriented to person, place and time.           ASSESSMENT AND PLAN  Problem List Items Addressed This Visit       Benign essential hypertension    Heart murmur    Hyperlipidemia    BMI 35.0-35.9,adult       [unfilled]

## 2024-07-05 ENCOUNTER — TELEPHONE (OUTPATIENT)
Dept: PRIMARY CARE | Facility: CLINIC | Age: 49
End: 2024-07-05
Payer: COMMERCIAL

## 2024-07-05 DIAGNOSIS — R91.1 PULMONARY NODULE: Primary | ICD-10-CM

## 2024-07-05 NOTE — TELEPHONE ENCOUNTER
"Patient had chest xray done prior to a procedure and had an incidental finding:    \"Small nodular focus projecting over the left lung apex is nonspecific and could represent a confluence of overlapping shadows. Cannot exclude small sclerotic bone lesion or a 5 mm pulmonary nodule. Given patient's neoplastic history, consider CT for further evaluation. \"    She is asking if you can order this prior to appointment in August   "

## 2024-08-24 ENCOUNTER — APPOINTMENT (OUTPATIENT)
Dept: PRIMARY CARE | Facility: CLINIC | Age: 49
End: 2024-08-24
Payer: COMMERCIAL

## 2024-08-24 VITALS
WEIGHT: 207.8 LBS | OXYGEN SATURATION: 98 % | DIASTOLIC BLOOD PRESSURE: 76 MMHG | BODY MASS INDEX: 35.67 KG/M2 | TEMPERATURE: 97.4 F | SYSTOLIC BLOOD PRESSURE: 119 MMHG | HEART RATE: 88 BPM

## 2024-08-24 DIAGNOSIS — E55.9 VITAMIN D DEFICIENCY: ICD-10-CM

## 2024-08-24 DIAGNOSIS — E78.2 MIXED HYPERLIPIDEMIA: ICD-10-CM

## 2024-08-24 DIAGNOSIS — M77.9 ENTHESITIS: ICD-10-CM

## 2024-08-24 DIAGNOSIS — I10 ESSENTIAL (PRIMARY) HYPERTENSION: ICD-10-CM

## 2024-08-24 DIAGNOSIS — I10 BENIGN ESSENTIAL HYPERTENSION: ICD-10-CM

## 2024-08-24 DIAGNOSIS — Z00.00 PHYSICAL EXAM, ANNUAL: ICD-10-CM

## 2024-08-24 DIAGNOSIS — E78.5 HYPERLIPIDEMIA, UNSPECIFIED HYPERLIPIDEMIA TYPE: ICD-10-CM

## 2024-08-24 DIAGNOSIS — Z00.00 ENCOUNTER FOR WELLNESS EXAMINATION IN ADULT: Primary | ICD-10-CM

## 2024-08-24 DIAGNOSIS — M25.50 ARTHRALGIA OF MULTIPLE SITES: ICD-10-CM

## 2024-08-24 DIAGNOSIS — Z09 ENCOUNTER FOR FOLLOW-UP: ICD-10-CM

## 2024-08-24 PROCEDURE — 3074F SYST BP LT 130 MM HG: CPT | Performed by: FAMILY MEDICINE

## 2024-08-24 PROCEDURE — 99214 OFFICE O/P EST MOD 30 MIN: CPT | Performed by: FAMILY MEDICINE

## 2024-08-24 PROCEDURE — 3078F DIAST BP <80 MM HG: CPT | Performed by: FAMILY MEDICINE

## 2024-08-24 RX ORDER — BISMUTH SUBSALICYLATE 262 MG
1 TABLET,CHEWABLE ORAL DAILY
COMMUNITY

## 2024-08-24 RX ORDER — HYDROCHLOROTHIAZIDE 25 MG/1
25 TABLET ORAL DAILY
Qty: 90 TABLET | Refills: 3 | Status: SHIPPED | OUTPATIENT
Start: 2024-08-24

## 2024-08-24 ASSESSMENT — ENCOUNTER SYMPTOMS
GASTROINTESTINAL NEGATIVE: 1
CARDIOVASCULAR NEGATIVE: 1
VOMITING: 0
DIARRHEA: 0
CONSTITUTIONAL NEGATIVE: 1
NAUSEA: 0
CHEST TIGHTNESS: 0
NEUROLOGICAL NEGATIVE: 1
ALLERGIC/IMMUNOLOGIC NEGATIVE: 1
EYES NEGATIVE: 1
HEMATOLOGIC/LYMPHATIC NEGATIVE: 1
PSYCHIATRIC NEGATIVE: 1
MUSCULOSKELETAL NEGATIVE: 1
ENDOCRINE NEGATIVE: 1
RESPIRATORY NEGATIVE: 1
SHORTNESS OF BREATH: 0

## 2024-08-24 NOTE — PATIENT INSTRUCTIONS
1 well visit    Today in the office you had your annual wellness exam    You are up-to-date with your mammogram for breast cancer screening    You are up-to-date with your colonoscopy with a Cologuard which was negative    Please have fasting labs at your convenience we will check kidney function liver function blood sugar cholesterol thyroid vitamin D we will notify you of all those results    Your blood pressure is excellent today please stay on current doses of hydrochlorothiazide 25 mg a day by lowering your blood pressure we are reducing risk for heart attack and stroke    Please stay on current doses of atorvastatin which lowers your cholesterol which also reduces risk for heart attack and stroke.  Equally as important is eating a heart healthy diet a good goal 5-7 servings of fresh fruit and vegetable every day along with lean proteins avoiding simple sugars avoiding fast foods    Keep exercising keep being active once your foot improves 30 minutes of exercise a day is ideal certainly would call if you have chest pains with activities    You have a significant mount of wax in your left ear we will flush that before you leave the office today.  Please continue using your hearing aids as you are reducing your risk for dementia later on in life    Continue following up with your gynecologist as we discussed    If all your labs returned normal and you remain healthy I will see you back in 6 months    If you change your mind about a flu shot you could have 1 done here or at the pharmacy

## 2024-08-24 NOTE — PROGRESS NOTES
Subjective   Patient ID: Cristela Borden is a 49 y.o. female who presents for Follow-up (6 mo fuv bp, chol, no issues or complaints today).    HPI     Hyperlipidemia: The patient is presenting today for a follow up of hyperlipidemia. The patient has not been hospitalized for this in the last 6 months. The patient is compliant with medications. Patient denies any side effects to the medications.     Hypertension: The patient is here for an evaluation of elevated blood pressure. The patient is trying to follow a low-salt diet. She is adherent to a low salt diet. Blood pressure is well controlled at home, with ranges being in not doing. The patient has not been hospitalized for this in the last 6 months. The patient is compliant with medications, which are currently  HCTZ . Patient denies any side effects to the medications.     Arthritis: Cristela Borden is an 49 y.o. female who presents with arthralgias and myalgias. Pain is located in multiple joints. The patient has not been hospitalized for this in the last 6 months.  She is still experiencing pain in her elbow where her pinky finger will become numb randomly.     The patient denies any changes in vision, hearing or dental.     The patient maintains they do not have any chest pain, chest tightness or shortness of breath.    They do not experience nausea, emesis, changes in bowel movements or dyspepsia.    The patient's colonoscopy is up to date.    The patient's mammogram is not up to date.    The patient's vaccinations are up to date.      Review of Systems   Constitutional: Negative.    HENT: Negative.  Negative for dental problem and hearing loss.    Eyes: Negative.  Negative for visual disturbance.   Respiratory: Negative.  Negative for chest tightness and shortness of breath.    Cardiovascular: Negative.  Negative for chest pain.   Gastrointestinal: Negative.  Negative for diarrhea, nausea and vomiting.   Endocrine: Negative.    Genitourinary: Negative.     Musculoskeletal: Negative.    Skin: Negative.    Allergic/Immunologic: Negative.    Neurological: Negative.    Hematological: Negative.    Psychiatric/Behavioral: Negative.         Objective   /76 (BP Location: Right arm, Patient Position: Sitting, BP Cuff Size: Large adult)   Pulse 88   Temp 36.3 °C (97.4 °F) (Temporal)   Wt 94.3 kg (207 lb 12.8 oz)   LMP  (LMP Unknown)   SpO2 98%   BMI 35.67 kg/m²     Physical Exam  Constitutional:       Appearance: Normal appearance.   HENT:      Head: Normocephalic and atraumatic.      Left Ear: There is impacted cerumen.      Nose: Nose normal.   Eyes:      Extraocular Movements: Extraocular movements intact.      Conjunctiva/sclera: Conjunctivae normal.      Pupils: Pupils are equal, round, and reactive to light.   Cardiovascular:      Rate and Rhythm: Normal rate and regular rhythm.      Pulses: Normal pulses.      Heart sounds: Normal heart sounds.   Pulmonary:      Effort: Pulmonary effort is normal.      Breath sounds: Normal breath sounds.   Abdominal:      General: Bowel sounds are normal.      Palpations: Abdomen is soft.   Genitourinary:     General: Normal vulva.      Rectum: Normal.   Musculoskeletal:         General: Normal range of motion.      Cervical back: Normal range of motion and neck supple.   Skin:     General: Skin is warm.   Neurological:      Mental Status: She is alert and oriented to person, place, and time.   Psychiatric:         Mood and Affect: Mood normal.         Behavior: Behavior normal.         Thought Content: Thought content normal.         Judgment: Judgment normal.         Assessment/Plan          1. Encounter for wellness examination in adult  Tsh With Reflex To Free T4 If Abnormal    Hemoglobin A1C    Lipid Panel    Comprehensive Metabolic Panel    CBC and Auto Differential    Vitamin D 25-Hydroxy,Total (for eval of Vitamin D levels)    C-reactive protein      2. Hyperlipidemia, unspecified hyperlipidemia type  Follow Up In  [facility] - PCP - Established      3. Encounter for follow-up        4. Benign essential hypertension        5. Arthralgia of multiple sites        6. Essential (primary) hypertension  hydroCHLOROthiazide (HYDRODiuril) 25 mg tablet    Follow Up In [facility] - PCP - Established      7. Physical exam, annual        8. Mixed hyperlipidemia        9. Enthesitis        10. Vitamin D deficiency        11. BMI 35.0-35.9,adult                #1  Hypertension   Your blood pressure is excellent today please stay on current doses of hydrochlorothiazide 25 mg a day by lowering your blood pressure we are reducing risk for heart attack and stroke  #2  Hypercholesterol  Please stay on current doses of atorvastatin which lowers your cholesterol which also reduces risk for heart attack and stroke.  Equally as important is eating a heart healthy diet a good goal 5-7 servings of fresh fruit and vegetable every day along with lean proteins avoiding simple sugars avoiding fast foods Keep exercising keep being active once your foot improves 30 minutes of exercise a day is ideal certainly would call if you have chest pains with activities    #3  Elevated CRP  Cervical Inflammation, follow up with GYN      Keep exercising keep being active once your foot improves 30 minutes of exercise a day is ideal certainly would call if you have chest pains with activities    You have a significant mount of wax in your left ear we will flush that before you leave the office today.  Please continue using your hearing aids as you are reducing your risk for dementia later on in life    Continue following up with your gynecologist as we discussed    You are up-to-date with your mammogram for breast cancer screening    You are up-to-date with your colonoscopy with a Cologuard which was negative    Please have fasting labs at your convenience we will check kidney function liver function blood sugar cholesterol thyroid vitamin D we  will notify you of all those results      If all your labs returned normal and you remain healthy I will see you back in 6 months    If you change your mind about a flu shot you could have 1 done here or at the pharmacy    Follow-up in 6 months or sooner if there are any concerns.    Scribe Attestation  By signing my name below, IBethany, Scribe   attest that this documentation has been prepared under the direction and in the presence of Kevyn Staton MD.    This note has been transcribed using a medical scribe and there is a possibility of unintentional typing misprints.

## 2024-11-22 ENCOUNTER — TELEPHONE (OUTPATIENT)
Dept: PRIMARY CARE | Facility: CLINIC | Age: 49
End: 2024-11-22
Payer: COMMERCIAL

## 2024-11-22 NOTE — TELEPHONE ENCOUNTER
Pt called about her bw. She had it done 2 months ago and never heard from the office. Pt states she had it done at [facility]. Can we look into getting the results.

## 2025-02-18 ENCOUNTER — APPOINTMENT (OUTPATIENT)
Dept: PRIMARY CARE | Facility: CLINIC | Age: 50
End: 2025-02-18
Payer: COMMERCIAL

## 2025-02-18 VITALS
WEIGHT: 208.6 LBS | OXYGEN SATURATION: 98 % | SYSTOLIC BLOOD PRESSURE: 136 MMHG | HEART RATE: 81 BPM | DIASTOLIC BLOOD PRESSURE: 70 MMHG | BODY MASS INDEX: 35.81 KG/M2 | TEMPERATURE: 98.9 F

## 2025-02-18 DIAGNOSIS — R73.01 ELEVATED FASTING BLOOD SUGAR: ICD-10-CM

## 2025-02-18 DIAGNOSIS — I10 ESSENTIAL (PRIMARY) HYPERTENSION: ICD-10-CM

## 2025-02-18 DIAGNOSIS — Z12.11 ENCOUNTER FOR SCREENING FOR MALIGNANT NEOPLASM OF COLON: ICD-10-CM

## 2025-02-18 DIAGNOSIS — E78.5 HYPERLIPIDEMIA, UNSPECIFIED: ICD-10-CM

## 2025-02-18 DIAGNOSIS — I10 BENIGN ESSENTIAL HYPERTENSION: ICD-10-CM

## 2025-02-18 DIAGNOSIS — Z00.00 ENCOUNTER FOR WELLNESS EXAMINATION IN ADULT: Primary | ICD-10-CM

## 2025-02-18 DIAGNOSIS — R70.0 ELEVATED SED RATE: ICD-10-CM

## 2025-02-18 DIAGNOSIS — Z09 ENCOUNTER FOR FOLLOW-UP: ICD-10-CM

## 2025-02-18 DIAGNOSIS — M54.12 CERVICAL RADICULOPATHY: ICD-10-CM

## 2025-02-18 DIAGNOSIS — Z12.31 BREAST CANCER SCREENING BY MAMMOGRAM: ICD-10-CM

## 2025-02-18 DIAGNOSIS — M25.50 ARTHRALGIA OF MULTIPLE SITES: ICD-10-CM

## 2025-02-18 DIAGNOSIS — E78.5 HYPERLIPIDEMIA, UNSPECIFIED HYPERLIPIDEMIA TYPE: ICD-10-CM

## 2025-02-18 DIAGNOSIS — R73.09 ELEVATED HEMOGLOBIN A1C MEASUREMENT: ICD-10-CM

## 2025-02-18 DIAGNOSIS — R79.82 ELEVATED C-REACTIVE PROTEIN (CRP): ICD-10-CM

## 2025-02-18 PROCEDURE — 3078F DIAST BP <80 MM HG: CPT | Performed by: FAMILY MEDICINE

## 2025-02-18 PROCEDURE — 99396 PREV VISIT EST AGE 40-64: CPT | Performed by: FAMILY MEDICINE

## 2025-02-18 PROCEDURE — 3075F SYST BP GE 130 - 139MM HG: CPT | Performed by: FAMILY MEDICINE

## 2025-02-18 RX ORDER — CELECOXIB 200 MG/1
200 CAPSULE ORAL DAILY
Qty: 90 CAPSULE | Refills: 3 | Status: SHIPPED | OUTPATIENT
Start: 2025-02-18 | End: 2026-02-18

## 2025-02-18 RX ORDER — HYDROCHLOROTHIAZIDE 25 MG/1
25 TABLET ORAL DAILY
Qty: 90 TABLET | Refills: 3 | Status: SHIPPED | OUTPATIENT
Start: 2025-02-18

## 2025-02-18 RX ORDER — MINOXIDIL 2.5 MG/1
2.5 TABLET ORAL EVERY 12 HOURS
COMMUNITY
Start: 2025-02-18

## 2025-02-18 RX ORDER — ATORVASTATIN CALCIUM 40 MG/1
40 TABLET, FILM COATED ORAL NIGHTLY
Qty: 90 TABLET | Refills: 3 | Status: SHIPPED | OUTPATIENT
Start: 2025-02-18

## 2025-02-18 RX ORDER — CELECOXIB 200 MG/1
200 CAPSULE ORAL DAILY
COMMUNITY
End: 2025-02-18 | Stop reason: SDUPTHER

## 2025-02-18 RX ORDER — NORETHINDRONE 0.35 MG/1
1 TABLET ORAL DAILY
COMMUNITY

## 2025-02-18 NOTE — PROGRESS NOTES
Subjective   Patient ID: Cristela Borden is a 49 y.o. female who presents for Follow-up (6 mo f/uv, htn, chol no other concerns).      HPI     Hypertension: The patient is here for an evaluation of elevated blood pressure. The patient is trying to follow a low-salt diet. She is adherent to a low salt diet. Blood pressure is well controlled at home, with ranges being in not doing. The patient has not been hospitalized for this in the last 6 months. The patient is compliant with medications, which are currently hydrochlorothiazide. Patient denies any side effects to the medications. Blood pressure in office today is 136/70.    Hyperlipidemia: The patient is presenting today for a follow up of hyperlipidemia. The patient recent blood work shows normal of lipid labs (please see attached labs in the note). The patient is trying to eat a heart healthy diet keeping in mind to eat healthy fats and to avoid fried foods, fatty foods that may elevate their levels. The patient has not been hospitalized for this in the last 6 months. Current medications used are atorvastatin. The patient is compliant with medications. Patient denies any side effects to the medications.     The patient is trying to stay active and healthy. They are currently exercising and remaining physically active. The aare maintaining a heathy diet that includes green leafy vegetables, fruits and proteins. They arestaying well hydrated.    The patient denies any changes in vision, hearing or dental.     The patient maintains they do not have any chest pain, chest tightness or shortness of breath.    They do not experience nausea, emesis, changes in bowel movements or dyspepsia.    The patient denies changes in or worsening of moods.    The patient's colonoscopy is not up to date.    The patient's mammogram is not up to date.    The patient's vaccinations are up to date.    Review of Systems   Constitutional: Negative.    HENT: Negative.  Negative for dental  problem and hearing loss.    Eyes: Negative.  Negative for visual disturbance.   Respiratory: Negative.  Negative for chest tightness and shortness of breath.    Cardiovascular: Negative.  Negative for chest pain.   Gastrointestinal: Negative.  Negative for constipation, diarrhea, nausea and vomiting.   Endocrine: Negative.    Genitourinary: Negative.    Musculoskeletal: Negative.    Skin: Negative.    Allergic/Immunologic: Negative.    Neurological: Negative.    Hematological: Negative.    Psychiatric/Behavioral: Negative.     14/14 systems reviewed and negative other than what is listed in the history of present illness     Objective   /70 (BP Location: Left arm, Patient Position: Sitting, BP Cuff Size: Large adult)   Pulse 81   Temp 37.2 °C (98.9 °F) (Temporal)   Wt 94.6 kg (208 lb 9.6 oz)   SpO2 98%   BMI 35.81 kg/m²     Physical Exam    Physical Exam  Constitutional:       Appearance: Normal appearance.   HENT:      Head: Normocephalic and atraumatic.      Nose: Nose normal.   Eyes:      Extraocular Movements: Extraocular movements intact.      Conjunctiva/sclera: Conjunctivae normal.      Pupils: Pupils are equal, round, and reactive to light.   Cardiovascular:      Rate and Rhythm: Normal rate and regular rhythm.      Pulses: Normal pulses.      Heart sounds: Normal heart sounds.   Pulmonary:      Effort: Pulmonary effort is normal.      Breath sounds: Normal breath sounds.   Abdominal:      General: Bowel sounds are normal.      Palpations: Abdomen is soft.   Musculoskeletal:         General: Normal range of motion.      Cervical back: Normal range of motion and neck supple.      Comments: Tenderness with flexion and flexion of the left shoulder. Reproducible pain with extension and flexion of left shoulder into the neck.    Skin:     General: Skin is warm.   Neurological:      Mental Status: She is alert and oriented to person, place, and time.   Psychiatric:         Mood and Affect: Mood normal.          Behavior: Behavior normal.         Thought Content: Thought content normal.         Judgment: Judgment normal.           Assessment/Plan     1. Encounter for wellness examination in adult        2. Essential (primary) hypertension  Follow Up In [facility] - PCP - Established    hydroCHLOROthiazide (HYDRODiuril) 25 mg tablet      3. Hyperlipidemia, unspecified hyperlipidemia type        4. Elevated C-reactive protein (CRP)        5. Arthralgia of multiple sites  celecoxib (CeleBREX) 200 mg capsule    Sedimentation Rate    C-reactive protein    Sedimentation Rate    C-reactive protein      6. Elevated sed rate        7. Breast cancer screening by mammogram  BI mammo bilateral screening tomosynthesis      8. Elevated fasting blood sugar        9. Encounter for follow-up  CBC and Auto Differential    Comprehensive Metabolic Panel    Lipid Panel    TSH with reflex to Free T4 if abnormal    Hemoglobin A1C    Vitamin D 25-Hydroxy,Total (for eval of Vitamin D levels)    CBC and Auto Differential    Comprehensive Metabolic Panel    Lipid Panel    TSH with reflex to Free T4 if abnormal    Hemoglobin A1C    Vitamin D 25-Hydroxy,Total (for eval of Vitamin D levels)      10. Benign essential hypertension        11. Encounter for screening for malignant neoplasm of colon  Cologuard® colon cancer screening    Cologuard® colon cancer screening    CANCELED: Colonoscopy Screening; Average Risk Patient      12. Hyperlipidemia, unspecified  atorvastatin (Lipitor) 40 mg tablet      13. BMI 35.0-35.9,adult        14. Cervical radiculopathy  Referral to Physical Therapy      15. Elevated hemoglobin A1c measurement          1.  Well visit    Today in the office you had your annual wellness exam    You are up-to-date with your mammogram for breast cancer screening    Will place an order for Cologuard for colon cancer screening    Please have fasting labs at your convenience we will check kidney function liver function  blood sugar cholesterol thyroid vitamin D we will notify you of all those results we will also include the hemoglobin A1c    Try to eat a heart healthy diet a good goal 5-7 servings of fresh fruit and vegetable daily along with lean protein avoid the simple sugars avoid the fast foods    Walking exercising being active starting to play pickle ball 30 minutes 5 days a week would be ideal this will not only help lower your blood pressure control your blood sugar it will make you feel better    You have some small nodules on the front of your shins I think this is from previous trauma please keep an eye on the areas if they start to get larger more painful I need to know otherwise I think they should slowly resolve or they will not get any larger or smaller    You have some left shoulder rotator cuff tendinitis with some left cervical radiculitis I am recommending physical therapy to help with the neck and shoulder in the meantime for the shoulder you can do some rotation exercises 100 counterclockwise 100 clockwise twice a day    Refills for your medications were sent to the pharmacy    If you otherwise stay healthy I will see you back 6 months I am happy to see you sooner if needed    Follow-up in 6 months or sooner if there are any concerns.    Scribe Attestation  By signing my name below, IBethany, Suzanne   attest that this documentation has been prepared under the direction and in the presence of Kevyn Staton MD.    This note has been transcribed using a medical scribe and there is a possibility of unintentional typing misprints.

## 2025-02-18 NOTE — PATIENT INSTRUCTIONS
1.  Well visit    Today in the office you had your annual wellness exam    You are up-to-date with your mammogram for breast cancer screening    Will place an order for Cologuard for colon cancer screening    Please have fasting labs at your convenience we will check kidney function liver function blood sugar cholesterol thyroid vitamin D we will notify you of all those results we will also include the hemoglobin A1c    Try to eat a heart healthy diet a good goal 5-7 servings of fresh fruit and vegetable daily along with lean protein avoid the simple sugars avoid the fast foods    Walking exercising being active starting to play [employer] ball 30 minutes 5 days a week would be ideal this will not only help lower your blood pressure control your blood sugar it will make you feel better    You have some small nodules on the front of your shins I think this is from previous trauma please keep an eye on the areas if they start to get larger more painful I need to know otherwise I think they should slowly resolve or they will not get any larger or smaller    You have some left shoulder rotator cuff tendinitis with some left cervical radiculitis I am recommending physical therapy to help with the neck and shoulder in the meantime for the shoulder you can do some rotation exercises 100 counterclockwise 100 clockwise twice a day    Refills for your medications were sent to the pharmacy    If you otherwise stay healthy I will see you back 6 months I am happy to see you sooner if needed

## 2025-02-19 PROBLEM — R73.01 ELEVATED FASTING BLOOD SUGAR: Status: ACTIVE | Noted: 2025-02-19

## 2025-03-02 LAB
25(OH)D3+25(OH)D2 SERPL-MCNC: 56 NG/ML (ref 30–100)
ALBUMIN SERPL-MCNC: 4.2 G/DL (ref 3.6–5.1)
ALP SERPL-CCNC: 68 U/L (ref 31–125)
ALT SERPL-CCNC: 17 U/L (ref 6–29)
ANION GAP SERPL CALCULATED.4IONS-SCNC: 9 MMOL/L (CALC) (ref 7–17)
AST SERPL-CCNC: 14 U/L (ref 10–35)
BASOPHILS # BLD AUTO: 79 CELLS/UL (ref 0–200)
BASOPHILS NFR BLD AUTO: 0.8 %
BILIRUB SERPL-MCNC: 0.8 MG/DL (ref 0.2–1.2)
BUN SERPL-MCNC: 18 MG/DL (ref 7–25)
CALCIUM SERPL-MCNC: 9.1 MG/DL (ref 8.6–10.2)
CHLORIDE SERPL-SCNC: 101 MMOL/L (ref 98–110)
CHOLEST SERPL-MCNC: 157 MG/DL
CHOLEST/HDLC SERPL: 2.3 (CALC)
CO2 SERPL-SCNC: 30 MMOL/L (ref 20–32)
CREAT SERPL-MCNC: 0.69 MG/DL (ref 0.5–0.99)
CRP SERPL-MCNC: NORMAL MG/L
EGFRCR SERPLBLD CKD-EPI 2021: 108 ML/MIN/1.73M2
EOSINOPHIL # BLD AUTO: 139 CELLS/UL (ref 15–500)
EOSINOPHIL NFR BLD AUTO: 1.4 %
ERYTHROCYTE [DISTWIDTH] IN BLOOD BY AUTOMATED COUNT: 12.2 % (ref 11–15)
ERYTHROCYTE [SEDIMENTATION RATE] IN BLOOD BY WESTERGREN METHOD: 28 MM/H
EST. AVERAGE GLUCOSE BLD GHB EST-MCNC: 128 MG/DL
EST. AVERAGE GLUCOSE BLD GHB EST-SCNC: 7.1 MMOL/L
GLUCOSE SERPL-MCNC: 93 MG/DL (ref 65–99)
HBA1C MFR BLD: 6.1 % OF TOTAL HGB
HCT VFR BLD AUTO: 37.8 % (ref 35–45)
HDLC SERPL-MCNC: 69 MG/DL
HGB BLD-MCNC: 12.7 G/DL (ref 11.7–15.5)
LDLC SERPL CALC-MCNC: 74 MG/DL (CALC)
LYMPHOCYTES # BLD AUTO: 2277 CELLS/UL (ref 850–3900)
LYMPHOCYTES NFR BLD AUTO: 23 %
MCH RBC QN AUTO: 32.6 PG (ref 27–33)
MCHC RBC AUTO-ENTMCNC: 33.6 G/DL (ref 32–36)
MCV RBC AUTO: 96.9 FL (ref 80–100)
MONOCYTES # BLD AUTO: 604 CELLS/UL (ref 200–950)
MONOCYTES NFR BLD AUTO: 6.1 %
NEUTROPHILS # BLD AUTO: 6801 CELLS/UL (ref 1500–7800)
NEUTROPHILS NFR BLD AUTO: 68.7 %
NONHDLC SERPL-MCNC: 88 MG/DL (CALC)
PLATELET # BLD AUTO: 411 THOUSAND/UL (ref 140–400)
PMV BLD REES-ECKER: 9.4 FL (ref 7.5–12.5)
POTASSIUM SERPL-SCNC: 4.2 MMOL/L (ref 3.5–5.3)
PROT SERPL-MCNC: 6.6 G/DL (ref 6.1–8.1)
RBC # BLD AUTO: 3.9 MILLION/UL (ref 3.8–5.1)
SODIUM SERPL-SCNC: 140 MMOL/L (ref 135–146)
TRIGL SERPL-MCNC: 62 MG/DL
TSH SERPL-ACNC: 2.14 MIU/L
WBC # BLD AUTO: 9.9 THOUSAND/UL (ref 3.8–10.8)

## 2025-03-03 LAB
CRP SERPL-MCNC: 24.4 MG/L
ERYTHROCYTE [SEDIMENTATION RATE] IN BLOOD BY WESTERGREN METHOD: 28 MM/H

## 2025-03-06 DIAGNOSIS — Z00.00 WELLNESS EXAMINATION: ICD-10-CM

## 2025-03-06 DIAGNOSIS — Z91.89 AT RISK FOR DIABETES MELLITUS: Primary | ICD-10-CM

## 2025-03-06 DIAGNOSIS — R73.01 ELEVATED FASTING BLOOD SUGAR: ICD-10-CM

## 2025-03-06 RX ORDER — METFORMIN HYDROCHLORIDE 500 MG/1
500 TABLET, EXTENDED RELEASE ORAL
Qty: 100 TABLET | Refills: 3 | Status: SHIPPED | OUTPATIENT
Start: 2025-03-06 | End: 2026-04-10

## 2025-06-06 ENCOUNTER — APPOINTMENT (OUTPATIENT)
Dept: CARDIOLOGY | Facility: CLINIC | Age: 50
End: 2025-06-06
Payer: COMMERCIAL

## 2025-06-06 VITALS
WEIGHT: 208.8 LBS | HEART RATE: 88 BPM | DIASTOLIC BLOOD PRESSURE: 84 MMHG | HEIGHT: 65 IN | SYSTOLIC BLOOD PRESSURE: 148 MMHG | BODY MASS INDEX: 34.79 KG/M2

## 2025-06-06 DIAGNOSIS — I10 ESSENTIAL (PRIMARY) HYPERTENSION: ICD-10-CM

## 2025-06-06 DIAGNOSIS — Z87.891 FORMER SMOKER: ICD-10-CM

## 2025-06-06 DIAGNOSIS — E78.5 HYPERLIPIDEMIA, UNSPECIFIED HYPERLIPIDEMIA TYPE: ICD-10-CM

## 2025-06-06 PROCEDURE — 1036F TOBACCO NON-USER: CPT | Performed by: INTERNAL MEDICINE

## 2025-06-06 PROCEDURE — 3079F DIAST BP 80-89 MM HG: CPT | Performed by: INTERNAL MEDICINE

## 2025-06-06 PROCEDURE — 99213 OFFICE O/P EST LOW 20 MIN: CPT | Performed by: INTERNAL MEDICINE

## 2025-06-06 PROCEDURE — 3008F BODY MASS INDEX DOCD: CPT | Performed by: INTERNAL MEDICINE

## 2025-06-06 PROCEDURE — 3077F SYST BP >= 140 MM HG: CPT | Performed by: INTERNAL MEDICINE

## 2025-06-06 RX ORDER — CHOLECALCIFEROL (VITAMIN D3) 25 MCG
25 TABLET ORAL DAILY
COMMUNITY

## 2025-06-06 NOTE — PROGRESS NOTES
"CARDIOLOGY OFFICE VISIT      CHIEF COMPLAINT  Chief Complaint   Patient presents with    Follow-up     1 year follow up on Hypertension  Hyperlipidemia  management        HISTORY OF PRESENT ILLNESS  The patient states that she has been doing well as far as she is concerned.  She states she checks her blood pressure almost every evening and it is good in the 120s over 70s.  She is not having any significant chest discomfort.  She denies any significant dyspnea.  She denies prolonged palpitations or syncope.  She states she did have a preop procedure EKG done at [facility] last year.  She states that they read it as a \"heart attack\".  I told her that this is most likely due to improper chest lead position of the electrodes.  We were able to finally follow-up that EKG and indeed it does show poor R wave progression and was read as a possible anterior myocardial infarction.  I told her I think this is just lead placement and she has not had that.  I did offer echo if she wanted to be 100% certain but she did not feel that we needed to do that     Impression:  Hypertension  Hyperlipidemia  Overweight  Former smoker     Please excuse any errors in grammar or translation related to this dictation. Voice recognition software was utilized to prepare this document.     Past Medical History  Medical History[1]    Social History  Social History[2]    Family History   Family History[3]     Allergies:  RX Allergies[4]     Outpatient Medications:  Current Outpatient Medications   Medication Instructions    atorvastatin (LIPITOR) 40 mg, oral, Nightly    calcium carbonate-vitamin D3 500 mg-5 mcg (200 unit) tablet 1 tablet, Daily    celecoxib (CELEBREX) 200 mg, oral, Daily    cholecalciferol (VITAMIN D3) 25 mcg, Daily    hydroCHLOROthiazide (HYDRODIURIL) 25 mg, oral, Daily, as directed    magnesium, amino acid chelate, 133 mg tablet 1 tablet, Daily    metFORMIN XR (GLUCOPHAGE-XR) 500 mg, oral, Daily with breakfast, Do not crush, chew, or " split.    minoxidil (LONITEN) 2.5 mg, Every 12 hours    norethindrone (Micronor) 0.35 mg tablet 1 tablet, Daily    turmeric 400 mg capsule Take by mouth.          REVIEW OF SYSTEMS  Review of Systems   All other systems reviewed and are negative.        VITALS  Vitals:    06/06/25 1015   BP: 148/84   Pulse: 88       PHYSICAL EXAM  Constitutional:       Appearance: Healthy appearance. Not in distress.   Neck:      Vascular: No JVR. JVD normal.   Pulmonary:      Effort: Pulmonary effort is normal.      Breath sounds: Normal breath sounds. No wheezing. No rhonchi. No rales.   Chest:      Chest wall: Not tender to palpatation.   Cardiovascular:      PMI at left midclavicular line. Normal rate. Regular rhythm. Normal S1. Normal S2.       Murmurs: There is no murmur.      No gallop.  No click. No rub.   Pulses:     Intact distal pulses.   Edema:     Peripheral edema absent.   Abdominal:      General: Bowel sounds are normal.      Palpations: Abdomen is soft.      Tenderness: There is no abdominal tenderness.   Musculoskeletal: Normal range of motion.         General: No tenderness. Skin:     General: Skin is warm and dry.   Neurological:      General: No focal deficit present.      Mental Status: Alert and oriented to person, place and time.           ASSESSMENT AND PLAN  Diagnoses and all orders for this visit:  Essential (primary) hypertension  Hyperlipidemia, unspecified hyperlipidemia type  Former smoker  BMI 35.0-35.9,adult      [unfilled]      I,Danika Rondon LPN am scribing for, and in the presence of Dr. Kenny Benavides MD, FACC.    I, Dr. Kenny Benavides MD, FACC, personally performed the services described in the documentation as scribed by Danika Rondon LPN in my presence, and confirm it is both accurate and complete.      Dr. Kenny Jenkins MD  Thank you for allowing me to participate in the care of this patient. Please do not hesitate to contact me with any further  questions or concerns.         [1]   Past Medical History:  Diagnosis Date    Atypical chest pain 2019    Does not use caffeine     Hypertension     Low back pain    [2]   Social History  Tobacco Use    Smoking status: Former     Current packs/day: 0.00     Average packs/day: 0.5 packs/day for 3.0 years (1.5 ttl pk-yrs)     Types: Cigarettes     Quit date:      Years since quittin.4     Passive exposure: Past    Smokeless tobacco: Never   Substance Use Topics    Alcohol use: Yes     Alcohol/week: 6.0 standard drinks of alcohol     Types: 6 Standard drinks or equivalent per week     Comment: social, 6 drinks weekly    Drug use: Not Currently   [3]   Family History  Problem Relation Name Age of Onset    Other (cerebrovascular accident CVA) Mother Daisy     Other (malignant neoplasm of breast) Mother Daisy     Skin cancer Mother Daisy     Rheum arthritis Mother Daisy     Arthritis Mother Daisy     Autoimmune disease Mother Daisy     Cancer Mother Daisy     Hearing loss Mother Daisy     Stroke Mother Daisy     Other (no pertinent family history) Father Forrest     Diabetes Father Forrest     Hypertension Father Forrest     Diabetes Sister Gila     Diabetes Father's Sister Iris     Pancreatic cancer Maternal Grandmother Nydia     Cancer Maternal Grandmother Nydia     Hypertension Maternal Grandmother Nydia     Diabetes Paternal Grandmother Manuela     Other (transposition of nerve in arm) Son      Hearing loss Son Yossi     Hearing loss Son Sam     Ankylosing spondylitis Cousin      Diabetes Other multiple     Hypertension Other multiple    [4] No Known Allergies

## 2025-06-06 NOTE — PATIENT INSTRUCTIONS
Continue same medications and treatments.   Patient educated on proper medication use.   Patient educated on risk factor modification.   Please bring any lab results from other providers / physicians to your next appointment.     Please bring all medicines, vitamins, and herbal supplements with you when you come to the office.     Prescriptions will not be filled unless you are compliant with your follow up appointments or have a follow up appointment scheduled as per instruction of your physician. Refills should be requested at the time of your visit.    FOLLOW UP IN 1 YEAR    I, Danika Rondon LPN, am scribing for and in the presence of Dr. Kenny Benavides MD, FACC

## 2025-08-22 ENCOUNTER — APPOINTMENT (OUTPATIENT)
Dept: PRIMARY CARE | Facility: CLINIC | Age: 50
End: 2025-08-22
Payer: COMMERCIAL

## 2025-09-09 ENCOUNTER — APPOINTMENT (OUTPATIENT)
Dept: PRIMARY CARE | Facility: CLINIC | Age: 50
End: 2025-09-09
Payer: COMMERCIAL

## 2026-06-05 ENCOUNTER — APPOINTMENT (OUTPATIENT)
Dept: CARDIOLOGY | Facility: CLINIC | Age: 51
End: 2026-06-05
Payer: COMMERCIAL